# Patient Record
Sex: MALE | Race: AMERICAN INDIAN OR ALASKA NATIVE
[De-identification: names, ages, dates, MRNs, and addresses within clinical notes are randomized per-mention and may not be internally consistent; named-entity substitution may affect disease eponyms.]

---

## 2019-08-19 ENCOUNTER — HOSPITAL ENCOUNTER (EMERGENCY)
Dept: HOSPITAL 5 - ED | Age: 24
Discharge: HOME | End: 2019-08-19
Payer: COMMERCIAL

## 2019-08-19 VITALS — SYSTOLIC BLOOD PRESSURE: 102 MMHG | DIASTOLIC BLOOD PRESSURE: 71 MMHG
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
## 2019-08-19 DIAGNOSIS — Z79.899: ICD-10-CM
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
## 2019-08-19 DIAGNOSIS — S01.81XA: Primary | ICD-10-CM

## 2019-08-19 PROCEDURE — 90715 TDAP VACCINE 7 YRS/> IM: CPT

## 2019-08-19 PROCEDURE — 70450 CT HEAD/BRAIN W/O DYE: CPT

## 2019-08-19 PROCEDURE — 90471 IMMUNIZATION ADMIN: CPT

## 2019-08-19 PROCEDURE — 70486 CT MAXILLOFACIAL W/O DYE: CPT
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
